# Patient Record
Sex: FEMALE | Race: WHITE | NOT HISPANIC OR LATINO | URBAN - METROPOLITAN AREA
[De-identification: names, ages, dates, MRNs, and addresses within clinical notes are randomized per-mention and may not be internally consistent; named-entity substitution may affect disease eponyms.]

---

## 2017-06-08 ENCOUNTER — INPATIENT (INPATIENT)
Facility: HOSPITAL | Age: 82
LOS: 0 days | Discharge: NURSING, REHAB OR LONG-TERM CARE | DRG: 640 | End: 2017-06-09
Attending: INTERNAL MEDICINE | Admitting: INTERNAL MEDICINE
Payer: COMMERCIAL

## 2017-06-08 VITALS
TEMPERATURE: 99 F | OXYGEN SATURATION: 94 % | SYSTOLIC BLOOD PRESSURE: 102 MMHG | RESPIRATION RATE: 17 BRPM | HEART RATE: 111 BPM | DIASTOLIC BLOOD PRESSURE: 62 MMHG

## 2017-06-08 LAB
ALBUMIN SERPL ELPH-MCNC: 3.1 G/DL — LOW (ref 3.3–5)
ALP SERPL-CCNC: 203 U/L — HIGH (ref 40–120)
ALT FLD-CCNC: 12 U/L — SIGNIFICANT CHANGE UP (ref 10–45)
ANION GAP SERPL CALC-SCNC: 12 MMOL/L — SIGNIFICANT CHANGE UP (ref 5–17)
APTT BLD: 30.7 SEC — SIGNIFICANT CHANGE UP (ref 27.5–37.4)
AST SERPL-CCNC: 38 U/L — SIGNIFICANT CHANGE UP (ref 10–40)
BASE EXCESS BLDV CALC-SCNC: 14.3 MMOL/L — SIGNIFICANT CHANGE UP
BASOPHILS NFR BLD AUTO: 0.3 % — SIGNIFICANT CHANGE UP (ref 0–2)
BILIRUB SERPL-MCNC: 0.9 MG/DL — SIGNIFICANT CHANGE UP (ref 0.2–1.2)
BUN SERPL-MCNC: 41 MG/DL — HIGH (ref 7–23)
CALCIUM SERPL-MCNC: 8.7 MG/DL — SIGNIFICANT CHANGE UP (ref 8.4–10.5)
CHLORIDE SERPL-SCNC: 81 MMOL/L — LOW (ref 96–108)
CO2 SERPL-SCNC: 38 MMOL/L — HIGH (ref 22–31)
CREAT SERPL-MCNC: 1.5 MG/DL — HIGH (ref 0.5–1.3)
EOSINOPHIL NFR BLD AUTO: 0.5 % — SIGNIFICANT CHANGE UP (ref 0–6)
GAS PNL BLDV: SIGNIFICANT CHANGE UP
GLUCOSE SERPL-MCNC: 123 MG/DL — HIGH (ref 70–99)
HCO3 BLDV-SCNC: 42 MMOL/L — HIGH (ref 20–27)
HCT VFR BLD CALC: 38.8 % — SIGNIFICANT CHANGE UP (ref 34.5–45)
HGB BLD-MCNC: 13 G/DL — SIGNIFICANT CHANGE UP (ref 11.5–15.5)
INR BLD: 1.58 — HIGH (ref 0.88–1.16)
LACTATE SERPL-SCNC: 2 MMOL/L — SIGNIFICANT CHANGE UP (ref 0.5–2)
LYMPHOCYTES # BLD AUTO: 17.8 % — SIGNIFICANT CHANGE UP (ref 13–44)
MCHC RBC-ENTMCNC: 31.2 PG — SIGNIFICANT CHANGE UP (ref 27–34)
MCHC RBC-ENTMCNC: 33.5 G/DL — SIGNIFICANT CHANGE UP (ref 32–36)
MCV RBC AUTO: 93 FL — SIGNIFICANT CHANGE UP (ref 80–100)
MONOCYTES NFR BLD AUTO: 10.6 % — SIGNIFICANT CHANGE UP (ref 2–14)
NEUTROPHILS NFR BLD AUTO: 70.8 % — SIGNIFICANT CHANGE UP (ref 43–77)
PCO2 BLDV: 61 MMHG — HIGH (ref 41–51)
PH BLDV: 7.45 — HIGH (ref 7.32–7.43)
PLATELET # BLD AUTO: 393 K/UL — SIGNIFICANT CHANGE UP (ref 150–400)
PO2 BLDV: 21 MMHG — SIGNIFICANT CHANGE UP
POTASSIUM SERPL-MCNC: 3.4 MMOL/L — LOW (ref 3.5–5.3)
POTASSIUM SERPL-SCNC: 3.4 MMOL/L — LOW (ref 3.5–5.3)
PROT SERPL-MCNC: 6.2 G/DL — SIGNIFICANT CHANGE UP (ref 6–8.3)
PROTHROM AB SERPL-ACNC: 17.7 SEC — HIGH (ref 9.8–12.7)
RBC # BLD: 4.17 M/UL — SIGNIFICANT CHANGE UP (ref 3.8–5.2)
RBC # FLD: 15.7 % — SIGNIFICANT CHANGE UP (ref 10.3–16.9)
SAO2 % BLDV: 36 % — SIGNIFICANT CHANGE UP
SODIUM SERPL-SCNC: 131 MMOL/L — LOW (ref 135–145)
WBC # BLD: 11.4 K/UL — HIGH (ref 3.8–10.5)
WBC # FLD AUTO: 11.4 K/UL — HIGH (ref 3.8–10.5)

## 2017-06-08 PROCEDURE — 71010: CPT | Mod: 26

## 2017-06-08 PROCEDURE — 70450 CT HEAD/BRAIN W/O DYE: CPT | Mod: 26

## 2017-06-08 PROCEDURE — 99223 1ST HOSP IP/OBS HIGH 75: CPT

## 2017-06-08 PROCEDURE — 93010 ELECTROCARDIOGRAM REPORT: CPT

## 2017-06-08 PROCEDURE — 99285 EMERGENCY DEPT VISIT HI MDM: CPT | Mod: 25

## 2017-06-08 RX ORDER — SODIUM CHLORIDE 9 MG/ML
1000 INJECTION INTRAMUSCULAR; INTRAVENOUS; SUBCUTANEOUS ONCE
Qty: 0 | Refills: 0 | Status: COMPLETED | OUTPATIENT
Start: 2017-06-08 | End: 2017-06-08

## 2017-06-08 RX ADMIN — SODIUM CHLORIDE 500 MILLILITER(S): 9 INJECTION INTRAMUSCULAR; INTRAVENOUS; SUBCUTANEOUS at 19:34

## 2017-06-08 NOTE — ED ADULT NURSE NOTE - CHIEF COMPLAINT QUOTE
Pt BIBA from [facility], staff there stated to EMS that pt had an episode of AMS this morning, time unknown. Pt is A&Ox3, no facial droop, pt denies any complaints.

## 2017-06-08 NOTE — ED PROVIDER NOTE - OBJECTIVE STATEMENT
89 yof pw AMS.  from [facility].  per niece, baseline ao x 3, however, today was confused, but has improved today.  limited history.  pt ao x 2.  chronic cough x months.  pt was admitted to [facility] for "water in her lungs".  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  no reported vomiting. 89 yof pw AMS.  from [facility].  per niece, baseline ao x 3, however, today was confused, but has improved since.  limited history.  pt ao x 2.  chronic cough x months.  pt was admitted to [facility] for "water in her lungs".  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  no reported vomiting.

## 2017-06-08 NOTE — ED ADULT TRIAGE NOTE - CHIEF COMPLAINT QUOTE
Pt BIBA from [facility], staff there stated to EMS that pt had an episode of AMS this morning, time unknown. Pt is A&Ox3, no facial droop, pt denies any complaints.

## 2017-06-08 NOTE — ED ADULT NURSE REASSESSMENT NOTE - NS ED NURSE REASSESS COMMENT FT1
Pt endorsed to me from previous shift, A&O x3 with a c/o AMS today.  Upon assessment pt able to recal Pt endorsed to me from previous shift, A&O x3 with a c/o AMS today.  Upon assessment pt able to recall name, place and date, able to recognize family members, but as per family but is on an off the state where she forgets things. Pt denies any pain, awaiting ct scan, and urine sample. Pt refusing straight cath, encouraged to drink fluids and food provided. Will continue to monitor.

## 2017-06-08 NOTE — ED ADULT NURSE REASSESSMENT NOTE - NS ED NURSE REASSESS COMMENT FT1
Pt incontinent of urine, wet linen changed and perianal care provided. MD Schmitz made aware, unable to collect urine sample. Will continue to monitor.

## 2017-06-08 NOTE — ED PROVIDER NOTE - PROGRESS NOTE DETAILS
further review of records, recent dx of afib, on eliquis and metoprolol, possibly transient hypotension 2/2 medication causing AMS pending urine, pt adamantly refuses straight cath, bladder scan w/o urinary retention xray w/o obvious opacity, pt no hypoxia or tachypnea, abd soft w/o tenderness, no vomiting, no focal bone pain, no prosthetic valve, no petechiae, nontoxic appearing, most likely source still urine, vs polypharmacy (new medication) vs overdiuresis given hyponatremia/hypokalemia/hypochloremia  admit for further gentle hydration, pending bd cx and UA to evaluate for infectious process notified by RN, pt incontinent, unable to obtain urine sample o/w.  given necessity for medical evaluation, will straight cath for urine.

## 2017-06-08 NOTE — ED PROVIDER NOTE - PHYSICAL EXAMINATION
CON: ao x 2 (place and person), HENMT: clear oropharynx, soft neck, HEAD: atraumatic, CV: rrr, equal pulses b/l, RESP: dec BS in lung bases, GI: +BS, soft, nontender, no rebound, no guarding, SKIN: decub sacral ulcer, 2 x LLE ulcer, no extending erythema or gangrene noted, MSK: no edema, moving all extremities spontaneously, NEURO: limited history 2/2 mental status CON: ao x 2 (place and person), HENMT: clear oropharynx, soft neck, HEAD: atraumatic, CV: irregularly irregular, equal pulses b/l, RESP: dec BS in lung bases, GI: +BS, soft, nontender, no rebound, no guarding, SKIN: decub sacral ulcer, 2 x LLE ulcer, no extending erythema or gangrene noted, NEURO: follows commands, conversing, UE strength 5/5 b/l, LE chronic weakness 3/5 b/l, cranial nerves iii-xii intact, full EOMI, perrl

## 2017-06-08 NOTE — ED PROVIDER NOTE - MEDICAL DECISION MAKING DETAILS
ams, rectal temp 99.8, HR >100 upon arrival, sepsis workup, CT head, UA, xray chest, abd soft, no vomiting or diarrhea, lower suspicion for acute intraabd infectious process,

## 2017-06-08 NOTE — ED ADULT NURSE NOTE - OBJECTIVE STATEMENT
Pt BIB[facility] from [facility] for episode of AMS earlier this morning. Per EMS pt was confused, RN staff did not give EMS reason for delay in transport. PT is alert and oriented x3, however cannot answer all medical hx questions and hx of current illness. Pt also forgot details she normally knows as per adult niece at bedside.  Lung sounds diminished by clear bilaterally. Pt BIBA from [facility] for episode of AMS earlier this morning. Per EMS pt was confused, RN staff did not give EMS reason for delay in transport. PT is alert and oriented x3, however cannot answer all medical hx questions and hx of current illness. Pt also forgot details she normally knows as per adult niece at bedside.  Lung sounds diminished by clear bilaterally.  Positive PMS x4 extremities, strong equal handgrips, no facial droop noted or slurred speech.  Pt has large stage 4 ulcer on sacral area, two stage 3 ulcers on left lower extremity.

## 2017-06-09 VITALS
SYSTOLIC BLOOD PRESSURE: 135 MMHG | DIASTOLIC BLOOD PRESSURE: 73 MMHG | TEMPERATURE: 98 F | HEART RATE: 94 BPM | OXYGEN SATURATION: 97 % | RESPIRATION RATE: 19 BRPM

## 2017-06-09 DIAGNOSIS — E86.0 DEHYDRATION: ICD-10-CM

## 2017-06-09 DIAGNOSIS — Z29.9 ENCOUNTER FOR PROPHYLACTIC MEASURES, UNSPECIFIED: ICD-10-CM

## 2017-06-09 DIAGNOSIS — I48.91 UNSPECIFIED ATRIAL FIBRILLATION: ICD-10-CM

## 2017-06-09 DIAGNOSIS — Z96.659 PRESENCE OF UNSPECIFIED ARTIFICIAL KNEE JOINT: Chronic | ICD-10-CM

## 2017-06-09 DIAGNOSIS — L98.499 NON-PRESSURE CHRONIC ULCER OF SKIN OF OTHER SITES WITH UNSPECIFIED SEVERITY: ICD-10-CM

## 2017-06-09 DIAGNOSIS — N17.9 ACUTE KIDNEY FAILURE, UNSPECIFIED: ICD-10-CM

## 2017-06-09 DIAGNOSIS — R63.8 OTHER SYMPTOMS AND SIGNS CONCERNING FOOD AND FLUID INTAKE: ICD-10-CM

## 2017-06-09 DIAGNOSIS — R41.82 ALTERED MENTAL STATUS, UNSPECIFIED: ICD-10-CM

## 2017-06-09 DIAGNOSIS — G93.41 METABOLIC ENCEPHALOPATHY: ICD-10-CM

## 2017-06-09 DIAGNOSIS — I50.9 HEART FAILURE, UNSPECIFIED: ICD-10-CM

## 2017-06-09 DIAGNOSIS — E87.1 HYPO-OSMOLALITY AND HYPONATREMIA: ICD-10-CM

## 2017-06-09 DIAGNOSIS — E87.3 ALKALOSIS: ICD-10-CM

## 2017-06-09 DIAGNOSIS — R65.10 SYSTEMIC INFLAMMATORY RESPONSE SYNDROME (SIRS) OF NON-INFECTIOUS ORIGIN WITHOUT ACUTE ORGAN DYSFUNCTION: ICD-10-CM

## 2017-06-09 LAB
ALBUMIN SERPL ELPH-MCNC: 2.6 G/DL — LOW (ref 3.3–5)
ALP SERPL-CCNC: 174 U/L — HIGH (ref 40–120)
ALT FLD-CCNC: 10 U/L — SIGNIFICANT CHANGE UP (ref 10–45)
ANION GAP SERPL CALC-SCNC: 12 MMOL/L — SIGNIFICANT CHANGE UP (ref 5–17)
APPEARANCE UR: CLEAR — SIGNIFICANT CHANGE UP
AST SERPL-CCNC: 39 U/L — SIGNIFICANT CHANGE UP (ref 10–40)
BILIRUB SERPL-MCNC: 0.9 MG/DL — SIGNIFICANT CHANGE UP (ref 0.2–1.2)
BILIRUB UR-MCNC: NEGATIVE — SIGNIFICANT CHANGE UP
BUN SERPL-MCNC: 32 MG/DL — HIGH (ref 7–23)
CALCIUM SERPL-MCNC: 8.1 MG/DL — LOW (ref 8.4–10.5)
CHLORIDE SERPL-SCNC: 88 MMOL/L — LOW (ref 96–108)
CHLORIDE UR-SCNC: <20 MMOL/L — SIGNIFICANT CHANGE UP
CO2 SERPL-SCNC: 35 MMOL/L — HIGH (ref 22–31)
COLOR SPEC: YELLOW — SIGNIFICANT CHANGE UP
CREAT ?TM UR-MCNC: 56 MG/DL — SIGNIFICANT CHANGE UP
CREAT SERPL-MCNC: 1.2 MG/DL — SIGNIFICANT CHANGE UP (ref 0.5–1.3)
DIFF PNL FLD: NEGATIVE — SIGNIFICANT CHANGE UP
GGT SERPL-CCNC: 26 U/L — SIGNIFICANT CHANGE UP (ref 8–40)
GLUCOSE SERPL-MCNC: 106 MG/DL — HIGH (ref 70–99)
GLUCOSE UR QL: NEGATIVE — SIGNIFICANT CHANGE UP
HCT VFR BLD CALC: 34.6 % — SIGNIFICANT CHANGE UP (ref 34.5–45)
HGB BLD-MCNC: 11.6 G/DL — SIGNIFICANT CHANGE UP (ref 11.5–15.5)
KETONES UR-MCNC: NEGATIVE — SIGNIFICANT CHANGE UP
LEUKOCYTE ESTERASE UR-ACNC: NEGATIVE — SIGNIFICANT CHANGE UP
MAGNESIUM SERPL-MCNC: 1.8 MG/DL — SIGNIFICANT CHANGE UP (ref 1.6–2.6)
MCHC RBC-ENTMCNC: 31.8 PG — SIGNIFICANT CHANGE UP (ref 27–34)
MCHC RBC-ENTMCNC: 33.5 G/DL — SIGNIFICANT CHANGE UP (ref 32–36)
MCV RBC AUTO: 94.8 FL — SIGNIFICANT CHANGE UP (ref 80–100)
NITRITE UR-MCNC: NEGATIVE — SIGNIFICANT CHANGE UP
OSMOLALITY SERPL: 291 MOSM/KG — SIGNIFICANT CHANGE UP (ref 280–301)
OSMOLALITY UR: 324 MOSMOL/KG — SIGNIFICANT CHANGE UP (ref 100–650)
PH UR: 5 — SIGNIFICANT CHANGE UP (ref 5–8)
PLATELET # BLD AUTO: 362 K/UL — SIGNIFICANT CHANGE UP (ref 150–400)
POTASSIUM SERPL-MCNC: 3.4 MMOL/L — LOW (ref 3.5–5.3)
POTASSIUM SERPL-SCNC: 3.4 MMOL/L — LOW (ref 3.5–5.3)
PROT SERPL-MCNC: 5.2 G/DL — LOW (ref 6–8.3)
PROT UR-MCNC: NEGATIVE MG/DL — SIGNIFICANT CHANGE UP
RAPID RVP RESULT: SIGNIFICANT CHANGE UP
RBC # BLD: 3.65 M/UL — LOW (ref 3.8–5.2)
RBC # FLD: 15.9 % — SIGNIFICANT CHANGE UP (ref 10.3–16.9)
SODIUM SERPL-SCNC: 135 MMOL/L — SIGNIFICANT CHANGE UP (ref 135–145)
SODIUM UR-SCNC: <20 MMOL/L — SIGNIFICANT CHANGE UP
SP GR SPEC: 1.01 — SIGNIFICANT CHANGE UP (ref 1–1.03)
TSH SERPL-MCNC: 0.92 UIU/ML — SIGNIFICANT CHANGE UP (ref 0.35–4.94)
UROBILINOGEN FLD QL: 0.2 E.U./DL — SIGNIFICANT CHANGE UP
UUN UR-MCNC: 563 MG/DL — SIGNIFICANT CHANGE UP
WBC # BLD: 9.6 K/UL — SIGNIFICANT CHANGE UP (ref 3.8–10.5)
WBC # FLD AUTO: 9.6 K/UL — SIGNIFICANT CHANGE UP (ref 3.8–10.5)

## 2017-06-09 PROCEDURE — 85730 THROMBOPLASTIN TIME PARTIAL: CPT

## 2017-06-09 PROCEDURE — 82436 ASSAY OF URINE CHLORIDE: CPT

## 2017-06-09 PROCEDURE — 93010 ELECTROCARDIOGRAM REPORT: CPT

## 2017-06-09 PROCEDURE — 87486 CHLMYD PNEUM DNA AMP PROBE: CPT

## 2017-06-09 PROCEDURE — 87581 M.PNEUMON DNA AMP PROBE: CPT

## 2017-06-09 PROCEDURE — 93306 TTE W/DOPPLER COMPLETE: CPT | Mod: 26

## 2017-06-09 PROCEDURE — 86780 TREPONEMA PALLIDUM: CPT

## 2017-06-09 PROCEDURE — 85025 COMPLETE CBC W/AUTO DIFF WBC: CPT

## 2017-06-09 PROCEDURE — 99285 EMERGENCY DEPT VISIT HI MDM: CPT | Mod: 25

## 2017-06-09 PROCEDURE — 87633 RESP VIRUS 12-25 TARGETS: CPT

## 2017-06-09 PROCEDURE — 85027 COMPLETE CBC AUTOMATED: CPT

## 2017-06-09 PROCEDURE — 83605 ASSAY OF LACTIC ACID: CPT

## 2017-06-09 PROCEDURE — 70450 CT HEAD/BRAIN W/O DYE: CPT

## 2017-06-09 PROCEDURE — 82607 VITAMIN B-12: CPT

## 2017-06-09 PROCEDURE — 83930 ASSAY OF BLOOD OSMOLALITY: CPT

## 2017-06-09 PROCEDURE — 87086 URINE CULTURE/COLONY COUNT: CPT

## 2017-06-09 PROCEDURE — 81003 URINALYSIS AUTO W/O SCOPE: CPT

## 2017-06-09 PROCEDURE — 71045 X-RAY EXAM CHEST 1 VIEW: CPT

## 2017-06-09 PROCEDURE — 84300 ASSAY OF URINE SODIUM: CPT

## 2017-06-09 PROCEDURE — 36415 COLL VENOUS BLD VENIPUNCTURE: CPT

## 2017-06-09 PROCEDURE — 93306 TTE W/DOPPLER COMPLETE: CPT

## 2017-06-09 PROCEDURE — 83935 ASSAY OF URINE OSMOLALITY: CPT

## 2017-06-09 PROCEDURE — 84540 ASSAY OF URINE/UREA-N: CPT

## 2017-06-09 PROCEDURE — 82570 ASSAY OF URINE CREATININE: CPT

## 2017-06-09 PROCEDURE — 93005 ELECTROCARDIOGRAM TRACING: CPT

## 2017-06-09 PROCEDURE — 87040 BLOOD CULTURE FOR BACTERIA: CPT

## 2017-06-09 PROCEDURE — 83735 ASSAY OF MAGNESIUM: CPT

## 2017-06-09 PROCEDURE — 80053 COMPREHEN METABOLIC PANEL: CPT

## 2017-06-09 PROCEDURE — 82803 BLOOD GASES ANY COMBINATION: CPT

## 2017-06-09 PROCEDURE — 85610 PROTHROMBIN TIME: CPT

## 2017-06-09 PROCEDURE — 99239 HOSP IP/OBS DSCHRG MGMT >30: CPT

## 2017-06-09 PROCEDURE — 82977 ASSAY OF GGT: CPT

## 2017-06-09 PROCEDURE — 84443 ASSAY THYROID STIM HORMONE: CPT

## 2017-06-09 PROCEDURE — 87798 DETECT AGENT NOS DNA AMP: CPT

## 2017-06-09 RX ORDER — SODIUM CHLORIDE 9 MG/ML
1000 INJECTION INTRAMUSCULAR; INTRAVENOUS; SUBCUTANEOUS
Qty: 0 | Refills: 0 | Status: COMPLETED | OUTPATIENT
Start: 2017-06-09 | End: 2017-06-09

## 2017-06-09 RX ORDER — FAMOTIDINE 10 MG/ML
1 INJECTION INTRAVENOUS
Qty: 0 | Refills: 0 | COMMUNITY

## 2017-06-09 RX ORDER — POTASSIUM CHLORIDE 20 MEQ
40 PACKET (EA) ORAL ONCE
Qty: 0 | Refills: 0 | Status: COMPLETED | OUTPATIENT
Start: 2017-06-09 | End: 2017-06-09

## 2017-06-09 RX ORDER — METOPROLOL TARTRATE 50 MG
12.5 TABLET ORAL
Qty: 0 | Refills: 0 | Status: DISCONTINUED | OUTPATIENT
Start: 2017-06-09 | End: 2017-06-09

## 2017-06-09 RX ORDER — SIMETHICONE 80 MG/1
0 TABLET, CHEWABLE ORAL
Qty: 0 | Refills: 0 | COMMUNITY

## 2017-06-09 RX ORDER — DOCUSATE SODIUM 100 MG
1 CAPSULE ORAL
Qty: 0 | Refills: 0 | COMMUNITY

## 2017-06-09 RX ORDER — SENNA PLUS 8.6 MG/1
1 TABLET ORAL
Qty: 0 | Refills: 0 | COMMUNITY

## 2017-06-09 RX ORDER — FAMOTIDINE 10 MG/ML
20 INJECTION INTRAVENOUS
Qty: 0 | Refills: 0 | Status: DISCONTINUED | OUTPATIENT
Start: 2017-06-09 | End: 2017-06-09

## 2017-06-09 RX ORDER — ONDANSETRON 8 MG/1
1 TABLET, FILM COATED ORAL
Qty: 0 | Refills: 0 | COMMUNITY

## 2017-06-09 RX ORDER — ASCORBIC ACID 60 MG
1 TABLET,CHEWABLE ORAL
Qty: 0 | Refills: 0 | COMMUNITY

## 2017-06-09 RX ORDER — HALOPERIDOL DECANOATE 100 MG/ML
5 INJECTION INTRAMUSCULAR ONCE
Qty: 0 | Refills: 0 | Status: DISCONTINUED | OUTPATIENT
Start: 2017-06-09 | End: 2017-06-09

## 2017-06-09 RX ORDER — OXYCODONE HYDROCHLORIDE 5 MG/1
1 TABLET ORAL
Qty: 0 | Refills: 0 | COMMUNITY

## 2017-06-09 RX ORDER — METOPROLOL TARTRATE 50 MG
0.5 TABLET ORAL
Qty: 0 | Refills: 0 | COMMUNITY

## 2017-06-09 RX ORDER — ACETAMINOPHEN 500 MG
2 TABLET ORAL
Qty: 0 | Refills: 0 | COMMUNITY

## 2017-06-09 RX ORDER — APIXABAN 2.5 MG/1
1 TABLET, FILM COATED ORAL
Qty: 0 | Refills: 0 | COMMUNITY

## 2017-06-09 RX ORDER — POTASSIUM CHLORIDE 20 MEQ
1 PACKET (EA) ORAL
Qty: 0 | Refills: 0 | COMMUNITY

## 2017-06-09 RX ORDER — HEPARIN SODIUM 5000 [USP'U]/ML
5000 INJECTION INTRAVENOUS; SUBCUTANEOUS EVERY 8 HOURS
Qty: 0 | Refills: 0 | Status: DISCONTINUED | OUTPATIENT
Start: 2017-06-09 | End: 2017-06-09

## 2017-06-09 RX ORDER — APIXABAN 2.5 MG/1
2.5 TABLET, FILM COATED ORAL
Qty: 0 | Refills: 0 | Status: DISCONTINUED | OUTPATIENT
Start: 2017-06-09 | End: 2017-06-09

## 2017-06-09 RX ORDER — DOCUSATE SODIUM 100 MG
100 CAPSULE ORAL
Qty: 0 | Refills: 0 | Status: DISCONTINUED | OUTPATIENT
Start: 2017-06-09 | End: 2017-06-09

## 2017-06-09 RX ADMIN — APIXABAN 2.5 MILLIGRAM(S): 2.5 TABLET, FILM COATED ORAL at 07:46

## 2017-06-09 RX ADMIN — Medication 40 MILLIEQUIVALENT(S): at 02:53

## 2017-06-09 RX ADMIN — Medication 100 MILLIGRAM(S): at 07:46

## 2017-06-09 RX ADMIN — SODIUM CHLORIDE 50 MILLILITER(S): 9 INJECTION INTRAMUSCULAR; INTRAVENOUS; SUBCUTANEOUS at 12:53

## 2017-06-09 RX ADMIN — Medication 12.5 MILLIGRAM(S): at 07:46

## 2017-06-09 RX ADMIN — FAMOTIDINE 20 MILLIGRAM(S): 10 INJECTION INTRAVENOUS at 07:46

## 2017-06-09 NOTE — H&P ADULT - PROBLEM SELECTOR PLAN 6
She has a sacral ulcer that is difficult to assess as she has siginficant pain when she turns on her side from arthritis.  ITis bandaged, appears clean around the edges but I was unable to assess stage.  She also has a stage 4 heel ulcer on her right heel. She is bedbound or in a wheelchair for the last 16 years she says since her knee replacement she was never able to regain her mobility.   -Wound care consult She has a sacral ulcer that is difficult to assess as she has significant pain when she turns on her side from arthritis.  ITis bandaged, appears clean around the edges but I was unable to assess stage.  She also has a stage 4 heel ulcer on her right heel. She is bedbound or in a wheelchair for the last 16 years she says since her knee replacement she was never able to regain her mobility.   -Wound care consult

## 2017-06-09 NOTE — H&P ADULT - PROBLEM SELECTOR PLAN 5
Afib is recently diagnosed and she is on Eliquis 2.5 BID which will be continued and will continue her Metoprolol 12.5mg PO BID for now as well as she looks very stable, her vitals are stable even in setting of possible sepsis.  Will add holding parameters and d/c if she decompensates. Afib is recently diagnosed and she is on Eliquis 2.5 BID which will be continued and will continue her Metoprolol 12.5mg PO BID for now as well as she looks very stable, her vitals are stable even in setting of possible sepsis.  Will add holding parameters and d/c if she decompensates.  Repeat EKG Afib is recently diagnosed and she is on Eliquis 2.5 BID which will be continued and will continue her Metoprolol 12.5mg PO BID for now as well as she looks very stable, her vitals are stable even in setting of possible sepsis.  Will add holding parameters and d/c if she decompensates.  Repeat EKG looks more like wandering pacemaker and not afib.  She either has pAFIB or misdiagnosed atrial pacemaker abnormality with possible additional conduction block.

## 2017-06-09 NOTE — PROGRESS NOTE ADULT - PROBLEM SELECTOR PLAN 1
Likely from severe dehydration. CT head wnl, UA, wnl, CXR without infiltrates.  * C/w gentle hydration for now.  * Check b12, folate, tsh.  * Check Utox.

## 2017-06-09 NOTE — H&P ADULT - HISTORY OF PRESENT ILLNESS
89F pmhx of afib on Eliquis who lives at [facility] presents from facility with AMS x 3 days.  She was initially accompanied by her niece HCP (Damien) who said that she had been confused for several days which is unlike her as usually she has excellent MS.  The patient says she remembers one incident where she was confused thinking that her fan was alive but besides that does not recall what happened.  She is not having any difficulty urinating, denies frequency or dysuria.  She is saying that she feels like she has to go but is not able to urinate in the bedpan, but bladder scan reportedly was 150cc and she is not uncomfortable.  She denies any fevers, is very chilly, no nausea, vomiting, diarrhea, abdominal pain, CP, SOB or cough.  She says she is feeling close to her baseline.  She is very alert on exam and answering all questions appropriately but is a poor historian. When code status was discussed she said she doesn't like to talk about that, but did not say that she wanted to be DNR/DNI so I explained that she will be considered full code (and explained what it meant) for the duration of the stay. She understands and agrees. I spoke with her several times about the importance of obtaining a urine sample and said that we would need to straight cath her for a sample which she has adamantly refused each time and her family members have refused.      In ED:  T 99.8 rectal,, /62 RR 1 O2 94% RA She was given 2L NS. 89F pmhx of afib on Eliquis who lives at [facility] presents from facility with AMS x 3 days.  She was initially accompanied by her niece HCP (Damien [phone]) who said that she had been confused for several days which is unlike her as usually she has excellent MS.  The patient says she remembers one incident where she was confused thinking that her fan was alive but besides that does not recall what happened.  She is not having any difficulty urinating, denies frequency or dysuria.  She is saying that she feels like she has to go but is not able to urinate in the bedpan, but bladder scan reportedly was 150cc and she is not uncomfortable.  She denies any fevers, is very chilly, no nausea, vomiting, diarrhea, abdominal pain, CP, SOB or cough.  She says she is feeling close to her baseline.  She is very alert on exam and answering all questions appropriately but is a poor historian. When code status was discussed she said she doesn't like to talk about that, but did not say that she wanted to be DNR/DNI so I explained that she will be considered full code (and explained what it meant) for the duration of the stay. She understands and agrees. I spoke with her several times about the importance of obtaining a urine sample and said that we would need to straight cath her for a sample which she has adamantly refused each time and her family members have refused.      In ED:  T 99.8 rectal,, /62 RR 1 O2 94% RA She was given 2L NS.

## 2017-06-09 NOTE — H&P ADULT - PROBLEM SELECTOR PLAN 7
pt states that she was placed in nursing home with LE edema and possibly CHF so will f/u collateral information on this and recheck echo today. Does not appear to be volume overloaded or in exacerbation at this point.  Already on BB, but not on ACE or ARB.  Will hold off starting now until we get a baseline Cr as it is elevated here. pt states that she was placed in nursing home with LE edema and possibly CHF so will f/u collateral information on this and recheck echo today. Does not appear to be volume overloaded or in exacerbation at this point.  Already on BB, but not on ACE or ARB.  Will hold off starting now until we get a baseline Cr as it is elevated here.  Check echo

## 2017-06-09 NOTE — DISCHARGE NOTE ADULT - PLAN OF CARE
Stay adequately hydrated You came in due to altered mental status. An image of your head was taken which showed no stroke or bleed. An ultrasound of the heart (Echo) was done to make sure your heart is pumping enough blood out during each heart beat. Your echo showed an adequately beating heart. No infection was found. Your labs were significant for dehydration. You were given IV fluids which helped. Please stay hydrated by increasing your water intake. Details on how to maintain adequate hydration have been listed.

## 2017-06-09 NOTE — DISCHARGE NOTE ADULT - HOSPITAL COURSE
89F pmhx of afib on Eliquis who lives at [facility] presents from facility with AMS x 3 days.  She was initially accompanied by her niece HCP (Damien [phone]) who said that she had been confused for several days which is unlike her as usually she has excellent MS.  The patient says she remembers one incident where she was confused thinking that her fan was alive but besides that does not recall what happened.  She is not having any difficulty urinating, denies frequency or dysuria.  She is saying that she feels like she has to go but is not able to urinate in the bedpan, but bladder scan reportedly was 150cc and she is not uncomfortable.  She denies any fevers, is very chilly, no nausea, vomiting, diarrhea, abdominal pain, CP, SOB or cough.  She says she is feeling close to her baseline.  She is very alert on exam and answering all questions appropriately but is a poor historian. When code status was discussed she said she doesn't like to talk about that, but did not say that she wanted to be DNR/DNI so I explained that she will be considered full code (and explained what it meant) for the duration of the stay. She understands and agrees. I spoke with her several times about the importance of obtaining a urine sample and said that we would need to straight cath her for a sample which she has adamantly refused each time and her family members have refused.      In ED:  T 99.8 rectal,, /62 RR 1 O2 94% RA She was given 1L NS in ED    Pt mentating well in AM s/p gentle hydration added. CT head neg for infarct/bleed. UA neg for UTI. RVP neg. TSH wnl. Echo done w/ EF 65%, mild LVH, trace AR, mild MR, trace TR, mild PHTN, trace DC. BCx NGTD. Labs significant for elevated bicarb, hypochloremia, and alkalosis on VBG. Urine lytes done showing likely pre-renal cause to TARA. Pt's AMS likely 2/2 dehydration. Pt educated on adequate PO intake. Now AAOx3, HD stable and ready for d/c.

## 2017-06-09 NOTE — H&P ADULT - PROBLEM SELECTOR PLAN 4
She states that she was recently told that labwork showed she has kidney disease which she has not had in the past.  She explains they told her there was a "blockage in 1 kidney" but did not have any intervention on this.  Will obtain collateral in am  -Repeat bladder scan (initial was 150cc) if she does not urinate soon.   She is adamantly refusing catheterization now.   -Repeat BMP, check urine electrolytes however she has now be fluid resuscitated with 2 L NS.

## 2017-06-09 NOTE — H&P ADULT - PROBLEM SELECTOR PLAN 3
Will add on serum OSM, appears dry/euvolemic on exam.  Is now s/p 2 L NS in ED, will discontinue fluids for now and repeat bmp.

## 2017-06-09 NOTE — H&P ADULT - PROBLEM SELECTOR PLAN 1
AMS has now resolved and she is feeling back to her baseline.  She did have tachycardia and a small leukocytosis upon arrival which could be 2/2 dehydration with afib or could represent signs of infection and sepsis.  She has adamantly refused straight cath for UA and is still trying to provide a sample as UTI is the most likely cause, however she is not having symptoms. AMS has now resolved and she is feeling back to her baseline.  She did have tachycardia and a small leukocytosis upon arrival which could be 2/2 dehydration or could represent signs of infection and sepsis.  She has adamantly refused straight cath for UA and is still trying to provide a sample as UTI is the most likely cause, however she is not having symptoms.

## 2017-06-09 NOTE — H&P ADULT - NSHPLABSRESULTS_GEN_ALL_CORE
06-08    131<L>  |  81<L>  |  41<H>  ----------------------------<  123<H>  3.4<L>   |  38<H>  |  1.50<H>    Ca    8.7      08 Jun 2017 19:28    TPro  6.2  /  Alb  3.1<L>  /  TBili  0.9  /  DBili  x   /  AST  38  /  ALT  12  /  AlkPhos  203<H>  06-08                            13.0   11.4  )-----------( 393      ( 08 Jun 2017 19:28 )             38.8             LIVER FUNCTIONS - ( 08 Jun 2017 19:28 )  Alb: 3.1 g/dL / Pro: 6.2 g/dL / ALK PHOS: 203 U/L / ALT: 12 U/L / AST: 38 U/L / GGT: x             PT/INR - ( 08 Jun 2017 19:29 )   PT: 17.7 sec;   INR: 1.58          PTT - ( 08 Jun 2017 19:29 )  PTT:30.7 sec    CXR: Rotated with no infiltrates or effusions  Ekg: afib without ischemic changes. 06-08    131<L>  |  81<L>  |  41<H>  ----------------------------<  123<H>  3.4<L>   |  38<H>  |  1.50<H>    Ca    8.7      08 Jun 2017 19:28    TPro  6.2  /  Alb  3.1<L>  /  TBili  0.9  /  DBili  x   /  AST  38  /  ALT  12  /  AlkPhos  203<H>  06-08                            13.0   11.4  )-----------( 393      ( 08 Jun 2017 19:28 )             38.8             LIVER FUNCTIONS - ( 08 Jun 2017 19:28 )  Alb: 3.1 g/dL / Pro: 6.2 g/dL / ALK PHOS: 203 U/L / ALT: 12 U/L / AST: 38 U/L / GGT: x             PT/INR - ( 08 Jun 2017 19:29 )   PT: 17.7 sec;   INR: 1.58          PTT - ( 08 Jun 2017 19:29 )  PTT:30.7 sec    CXR: Rotated with no infiltrates or effusions  Ekg: Shows multifocal atrial tachycardia with some leads looking like 2nd degree AV block.

## 2017-06-09 NOTE — DISCHARGE NOTE ADULT - CARE PLAN
Principal Discharge DX:	Altered mental state  Goal:	Stay adequately hydrated  Instructions for follow-up, activity and diet:	You came in due to altered mental status. An image of your head was taken which showed no stroke or bleed. An ultrasound of the heart (Echo) was done to make sure your heart is pumping enough blood out during each heart beat. Your echo showed an adequately beating heart. No infection was found. Your labs were significant for dehydration. You were given IV fluids which helped. Please stay hydrated by increasing your water intake. Details on how to maintain adequate hydration have been listed.

## 2017-06-09 NOTE — PROGRESS NOTE ADULT - ASSESSMENT
89yo F, PMH of Afib on Xarelto. Sent for NH for acute AMS. Admitted for acute encephalopathy likely 2/2 dehydration.

## 2017-06-09 NOTE — PROGRESS NOTE ADULT - PROBLEM SELECTOR PLAN 3
Likely from volume depletion and chronic diuretic use.  * Holding HCTZ and lasix for now.  * C/w gentle hydration and encourage PO inatake.

## 2017-06-09 NOTE — H&P ADULT - ATTENDING COMMENTS
89F hx Afib (on Eliquis), ?CHF (med rec contains Lasix and HCTZ, but [facility] documents indicate she no longer takes), bedbound/wheelchair bound who presents with AMS x3d at [facility]. Per documents, pt has been more confused the past 3 days, which is unusual for her. Her ppw shows she was mildly tachycardic but afebrile at [facility] (at least on day of presentation). She was sent in for CT to r/o stroke. Pt notes cough for a few days, but otherwise ROS negative. While in ED, pt noted to have low grade temp (100.0 rectal), was mildly tachycardic, with mild leukocytosis, and labs with hyponatremia, hypokalemia, metabolic alkalosis, and TARA (prerenal).  VS as above  NAD, unable to assess orientation as pt only answering with a word at a time and refusing to answer appropriately (do not think she is unable to)  Very dry MMs  Slightly tachy, irregular, +systolic murmur  Lung exam limited 2/2 pt noncompliance but RLL crackles appreciated  Abd soft, slightly distended in lower abdomen, mildly tender in lower abdomen  +b/l LE edema with multiple ulcers on b/l LEs (clean), and sacral decub stage III-IV (also clean-appearing)  Neuro difficult to assess 2/2 pt noncompliance but moving all extremities spontaneously  Labs and imaging reviewed  A/P: 89F who presented from NH with intermittent confusion x3 days. Pt also with low grade fevers and mild leukocytosis. In setting of cough, suspect possible viral syndrome driving her presentation. Will send RVP. Blood cultures sent. Hold abx for now as no obvious source of bacterial infection. Pt also refusing urinary catheterization despite incontinence and with abdominal distention, possibly indicating retention. This could also cause AMS. Would repeat bladder scan post-void. Pt received 2L NS in ED as labs all indicate significant dehydration. Pt still appears dry, but will hold on further IVF until cardiac function known. Echo ordered. Continue Eliquis for A fib (EKG not A fib, likely sinus arrhythmia vs wandering atrial pacemaker).

## 2017-06-09 NOTE — DISCHARGE NOTE ADULT - PATIENT PORTAL LINK FT
“You can access the [facility] Patient Portal, offered by [facility], by registering with the following website: [email or web address]”

## 2017-06-09 NOTE — DISCHARGE NOTE ADULT - MEDICATION SUMMARY - MEDICATIONS TO TAKE
I will START or STAY ON the medications listed below when I get home from the hospital:    Percocet 10/325 oral tablet  -- 1 tab(s) by mouth every 6 hours  -- Indication: For pain    oxyCODONE 5 mg oral tablet  -- 1 tab(s) by mouth every 6 hours, As Needed  -- Indication: For pain    Tylenol 325 mg oral tablet  -- 2 tab(s) by mouth every 4 hours, As Needed  -- Indication: For pain    Eliquis 2.5 mg oral tablet  -- 1 tab(s) by mouth 2 times a day  -- Indication: For Afib    Zofran 4 mg oral tablet  -- 1 tab(s) by mouth every 8 hours, As Needed  -- Indication: For Nausea    metoprolol tartrate 25 mg oral tablet  -- 0.5 tab(s) by mouth 2 times a day  -- Indication: For Afib    famotidine 20 mg oral tablet  -- 1 tab(s) by mouth 2 times a day  -- Indication: For Reflux    Colace 100 mg oral capsule  -- 1 cap(s) by mouth 2 times a day  -- Indication: For constipation    senna 8.6 mg oral tablet  -- 1 tab(s) by mouth once a day (at bedtime)  -- Indication: For constipation    potassium chloride 10 mEq oral capsule, extended release  -- 1 cap(s) by mouth once a day  -- Indication: For Nutrition, metabolism, and development symptoms    simethicone 40 mg oral disintegrating strip  --  by mouth   -- Indication: For gas    multivitamin  -- 1 tab(s) by mouth once a day  -- Indication: For Nutrition, metabolism, and development symptoms    Vitamin C 500 mg oral tablet  -- 1 tab(s) by mouth once a day  -- Indication: For Nutrition, metabolism, and development symptoms

## 2017-06-09 NOTE — DISCHARGE NOTE ADULT - INSTRUCTIONS
Please stay well hydrated. Drink up to 8 servings of 8 ounces of water a day. Active people should drink at least 16- 20 ounces of fluid one to two hours before an outdoor activity. After that, you should consume 6 to 12 ounces of fluid every 10 to 15 minutes that you are outside. When you are finished with the activity, you should drink at least another 16 to 24 ounces (2- 3 cups) to replace what you have lost.    Water is all you need if you are planning to be active in a low or moderate intensity activity, such as walking, for only an hour or less. If you plan to be exercising longer than that, or if you anticipate being out in the sun for more than a few hours, you may want to hydrate with some kind of sports drink. These replace not only fluid, but also chemicals like sodium and potassium, which are lost through perspiration. Too much or too little sodium and potassium in the body can cause trouble. Muscle cramping may be due to a deficiency of electrolytes, such as sodium and potassium.    Alcoholic and caffeinated beverages, such as coffee, teas, and theron, are not recommended for optimal hydration. These fluids tend to pull water from the body and promote dehydration. Fruit juice and fruit drinks may have too many carbohydrates, too little sodium, and may upset the stomach. If you're going to drink fruit juices while exercising, you may try diluting them with 50% fruit juice and 50% water first.

## 2017-06-09 NOTE — PROGRESS NOTE ADULT - SUBJECTIVE AND OBJECTIVE BOX
CC: Thirsty. Denies other complaints. Denies cp, sob, n/v/d/c.    Vital Signs Last 24 Hrs  T(C): 36.3, Max: 37.8 (06-08 @ 22:04)  T(F): 97.3, Max: 100 (06-08 @ 22:04)  HR: 92 (92 - 111)  BP: 125/66 (102/62 - 150/86)  BP(mean): --  RR: 20 (17 - 20)  SpO2: 100% (94% - 100%)    PHYSICAL EXAMINATION  * General: Not in acute distress. Awake and alert. Lying comfortably in bed.  * Head: Normocephalic, atraumatic.  * HEENT: ears no discharge, eyes PERRLA, nose no discharge, throat no exudates, normal tonsils.  * Neck: no JVD, supple.  * Lungs: Clear to auscultation, no rales, no wheezes.  * Cardio: Regular rate and rhythm, no murmurs, no rubs, no gallops. Good peripheral pulses.  * Abdomen: Soft, non-tender, non-distended, tympanic to percussion, no rebound, no guarding, no rigidity. Bowel sounds present. No suprapubic or CVA tenderness.  * : Deferred.  * Extremities: Acyanotic, no edema.  * Skin: Warm and dry.  * Neuro: Alert and oriented x 3 (said her name, "[facility]", "June"). No focal deficits. Motor strength is 5/5 throughout. Sensation intact. Cranial nerves II-XII grossly intact.                           11.6   9.6   )-----------( 362      ( 09 Jun 2017 07:27 )             34.6   06-09    135  |  88<L>  |  32<H>  ----------------------------<  106<H>  3.4<L>   |  35<H>  |  1.20    Ca    8.1<L>      09 Jun 2017 07:27  Mg     1.8     06-09    TPro  5.2<L>  /  Alb  2.6<L>  /  TBili  0.9  /  DBili  x   /  AST  39  /  ALT  10  /  AlkPhos  174<H>  06-09    MEDICATIONS  (STANDING):  apixaban 2.5milliGRAM(s) Oral two times a day  metoprolol 12.5milliGRAM(s) Oral two times a day  docusate sodium 100milliGRAM(s) Oral two times a day  famotidine    Tablet 20milliGRAM(s) Oral two times a day

## 2017-06-09 NOTE — H&P ADULT - NSHPPHYSICALEXAM_GEN_ALL_CORE
ICU Vital Signs Last 24 Hrs  T(C): 37.8, Max: 37.8 (06-08 @ 22:04)  T(F): 100, Max: 100 (06-08 @ 22:04)  HR: 96 (96 - 111)  BP: 117/70 (102/62 - 117/70)  BP(mean): --  ABP: --  ABP(mean): --  RR: 18 (17 - 18)  SpO2: 96% (94% - 96%)  PHYSICAL EXAM:  Constitutional: NAd, comfortable, AAOx3 and answer questions appropriately but poor historian.  Appears in pain in her ankle and back when she moves  Eyes: Exudate on left eye, no erythema  ENMT: Dry MM, No JVD  Back: Sacral ulcer, difficult to assess that is dressed and appears approx 3cm in diameter w/o surrounding cellulitis.  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  No evidence of edema or cellulitis   Neurological: 5/5 ue strength, biceps, brachiradialis tendon reflexes 2+, unable to lift b/l legs against gravity, says this is not new for her, and decreased sensation on the left as compard to right leg. No patellar reflex- knee replacement ICU Vital Signs Last 24 Hrs  T(C): 37.8, Max: 37.8 (06-08 @ 22:04)  T(F): 100, Max: 100 (06-08 @ 22:04)  HR: 96 (96 - 111)  BP: 117/70 (102/62 - 117/70)  BP(mean): --  ABP: --  ABP(mean): --  RR: 18 (17 - 18)  SpO2: 96% (94% - 96%)  PHYSICAL EXAM:  Constitutional: NAd, comfortable, AAOx3 and answer questions appropriately but poor historian.  Appears in pain in her ankle and back when she moves  Eyes: Exudate on left eye, no erythema  ENMT: Dry MM, No JVD  Back: Sacral ulcer, difficult to assess that is dressed and appears approx 3cm in diameter w/o surrounding cellulitis.  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 SHe has 2 stage 4 ulces on her left lower extermity and a stage 3 ulcer on her sacrum.  No evidence of edema or cellulitis   Neurological: 5/5 ue strength, biceps, brachiradialis tendon reflexes 2+, unable to lift b/l legs against gravity, says this is not new for her, and decreased sensation on the left as compard to right leg. No patellar reflex- knee replacement

## 2017-06-09 NOTE — H&P ADULT - PROBLEM SELECTOR PLAN 2
Suspected source is most likely urine vs. simple dehydration with afib  and AMS from sundowning as it has now resolved and she is back at normal MS.    -Check UA/Ucx ASAP, repeat bladder scan if unable to urinate  -CXR shows no infiltrates, has no cough or rhonchi  -Elevated alk phos with a completely benign abdominal exam, will repeat cMP and get a GGT. Very low suspicion of intraabdominal infection or cholelithiasis with no abdominal pain on history and no diarrhea. Suspected source is most likely urine vs. simple dehydration with afib  and AMS from sundowning as it has now resolved and she is back at normal MS.    -Check UA/Ucx ASAP, repeat bladder scan if unable to urinate  -CXR shows no infiltrates, has no cough or rhonchi  -Elevated alk phos with a completely benign abdominal exam, will repeat cMP and get a GGT. Very low suspicion of intraabdominal infection or cholelithiasis with no abdominal pain on history and no diarrhea.  Does not meet sepsis criteria but blood culture were sent and IVF given. F/U UA.

## 2017-06-09 NOTE — PROGRESS NOTE ADULT - PROBLEM SELECTOR PLAN 2
FeNa: <1%. s/p IVF hydration --carefull. CXR looks like mildly congested: right fissure...  * c/w gentle hydration.  * Please, check TTE.

## 2017-06-09 NOTE — DISCHARGE NOTE ADULT - CARE PROVIDER_API CALL
Rebecca Cheung), Family Medicine  [address] [place] [address]  Phone: [phone]  Fax: [phone]

## 2017-06-10 LAB
CULTURE RESULTS: SIGNIFICANT CHANGE UP
SPECIMEN SOURCE: SIGNIFICANT CHANGE UP
VIT B12 SERPL-MCNC: 548 PG/ML — SIGNIFICANT CHANGE UP (ref 243–894)

## 2017-06-11 LAB — T PALLIDUM AB TITR SER: NEGATIVE — SIGNIFICANT CHANGE UP

## 2017-06-12 DIAGNOSIS — E87.3 ALKALOSIS: ICD-10-CM

## 2017-06-12 DIAGNOSIS — Z74.01 BED CONFINEMENT STATUS: ICD-10-CM

## 2017-06-12 DIAGNOSIS — G93.41 METABOLIC ENCEPHALOPATHY: ICD-10-CM

## 2017-06-12 DIAGNOSIS — R33.9 RETENTION OF URINE, UNSPECIFIED: ICD-10-CM

## 2017-06-12 DIAGNOSIS — Z79.01 LONG TERM (CURRENT) USE OF ANTICOAGULANTS: ICD-10-CM

## 2017-06-12 DIAGNOSIS — E87.1 HYPO-OSMOLALITY AND HYPONATREMIA: ICD-10-CM

## 2017-06-12 DIAGNOSIS — I48.91 UNSPECIFIED ATRIAL FIBRILLATION: ICD-10-CM

## 2017-06-12 DIAGNOSIS — E87.6 HYPOKALEMIA: ICD-10-CM

## 2017-06-12 DIAGNOSIS — N17.9 ACUTE KIDNEY FAILURE, UNSPECIFIED: ICD-10-CM

## 2017-06-12 DIAGNOSIS — Z88.0 ALLERGY STATUS TO PENICILLIN: ICD-10-CM

## 2017-06-12 DIAGNOSIS — R32 UNSPECIFIED URINARY INCONTINENCE: ICD-10-CM

## 2017-06-12 DIAGNOSIS — Z88.6 ALLERGY STATUS TO ANALGESIC AGENT: ICD-10-CM

## 2017-06-12 DIAGNOSIS — L89.154 PRESSURE ULCER OF SACRAL REGION, STAGE 4: ICD-10-CM

## 2017-06-12 DIAGNOSIS — L89.614 PRESSURE ULCER OF RIGHT HEEL, STAGE 4: ICD-10-CM

## 2017-06-12 DIAGNOSIS — E86.0 DEHYDRATION: ICD-10-CM

## 2017-06-12 DIAGNOSIS — M19.90 UNSPECIFIED OSTEOARTHRITIS, UNSPECIFIED SITE: ICD-10-CM

## 2017-06-12 DIAGNOSIS — I50.9 HEART FAILURE, UNSPECIFIED: ICD-10-CM

## 2017-06-12 DIAGNOSIS — Z28.21 IMMUNIZATION NOT CARRIED OUT BECAUSE OF PATIENT REFUSAL: ICD-10-CM

## 2017-06-12 DIAGNOSIS — Z96.659 PRESENCE OF UNSPECIFIED ARTIFICIAL KNEE JOINT: ICD-10-CM

## 2017-06-12 DIAGNOSIS — F05 DELIRIUM DUE TO KNOWN PHYSIOLOGICAL CONDITION: ICD-10-CM

## 2017-06-12 DIAGNOSIS — R05 COUGH: ICD-10-CM

## 2017-06-13 LAB
CULTURE RESULTS: SIGNIFICANT CHANGE UP
CULTURE RESULTS: SIGNIFICANT CHANGE UP
SPECIMEN SOURCE: SIGNIFICANT CHANGE UP
SPECIMEN SOURCE: SIGNIFICANT CHANGE UP

## 2017-06-20 NOTE — ED ADULT NURSE NOTE - SKIN TURGOR
They ordered a reset on a CPAP, states she needs the actual CPAP machine.  She doesn't have the machine or any supplies.   Please call patient [phone]   resilient/elastic
